# Patient Record
(demographics unavailable — no encounter records)

---

## 2024-11-22 NOTE — DISCUSSION/SUMMARY
[FreeTextEntry1] : FLU GIVEN TODAY Provided counseling on the diseases to be vaccinated against as well as the risks/benefits of providing and withholding recommended vaccines to be given today to JAYDE .All questions were answered and the parent verbalized understanding.
Detail Level: Detailed
Add 33507 Cpt? (Important Note: In 2017 The Use Of 39682 Is Being Tracked By Cms To Determine Future Global Period Reimbursement For Global Periods): yes

## 2025-03-13 NOTE — DISCUSSION/SUMMARY
[FreeTextEntry1] : LOM Complete antibiotic course. Potential side effect of antibiotics includes but not limited to diarrhea. Provide ibuprofen as needed for pain or fever. If no improvement within 48 hours return for re-evaluation. Follow up in 2-3 wks for tympanometry.  Viral syndrome most likely. Physiologic nature of fever explained. Reviewed proper doses of acetaminophen and ibuprofen. Provided with guidance as to when to call or return to office.  RTO PRN advised on signs and symptoms requiring re evaluation.

## 2025-03-13 NOTE — PHYSICAL EXAM
[Clear] : right tympanic membrane clear [Erythema] : erythema [Retracted] : retracted [NL] : warm, clear [FreeTextEntry4] : congestion [de-identified] : lace rash on arms and abdomen, cheeks bright pink

## 2025-03-13 NOTE — HISTORY OF PRESENT ILLNESS
[de-identified] : fever [FreeTextEntry6] : fever Tmax 102.6 cold sx, cough and congestion sick x 2-3 days

## 2025-03-26 NOTE — DISCUSSION/SUMMARY
[FreeTextEntry1] : resolved OM residual congestion part of URI based will resolved w time saline f/u prn

## 2025-04-17 NOTE — DISCUSSION/SUMMARY
[FreeTextEntry1] : Complete antibiotic course. Potential side effect of antibiotics includes but not limited to diarrhea. Provide ibuprofen as needed for pain or fever. If no improvement within 48 hours return for re-evaluation. Follow up in 10 days

## 2025-04-17 NOTE — REVIEW OF SYSTEMS
[Fever] : fever [Ear Pain] : ear pain [Nasal Congestion] : nasal congestion [Negative] : Genitourinary [Chills] : no chills [Headache] : no headache [Eye Discharge] : no eye discharge [Nasal Discharge] : no nasal discharge [Sore Throat] : no sore throat [Cough] : no cough [Vomiting] : no vomiting

## 2025-04-17 NOTE — HISTORY OF PRESENT ILLNESS
[de-identified] : fever ear ache congestion [FreeTextEntry6] : 102 last night with congestion right ear pain

## 2025-04-17 NOTE — PHYSICAL EXAM
[Erythema] : erythema [Purulent Effusion] : purulent effusion [Clear to Auscultation Bilaterally] : clear to auscultation bilaterally [NL] : warm, clear [Acute Distress] : no acute distress [Conjuctival Injection] : no conjunctival injection [Discharge] : no discharge [Clear] : right tympanic membrane not clear

## 2025-04-22 NOTE — HISTORY OF PRESENT ILLNESS
[de-identified] : BLOOD IN UNDERWEAR [FreeTextEntry6] : noticed dry blood in underwear last night some discharge dry on underwear today no fever , no trauma no N or V no dysuria acting normal

## 2025-04-22 NOTE — DISCUSSION/SUMMARY
[FreeTextEntry1] : Advised Vaseline to introitus reassured for now if recurrence - to return  UA nl doubt UTI

## 2025-04-25 NOTE — PHYSICAL EXAM
[NL] : warm, clear [Clear Effusion] : clear effusion [Purulent Effusion] : purulent effusion [Mucoid Discharge] : mucoid discharge [FreeTextEntry3] : BSOM

## 2025-04-25 NOTE — DISCUSSION/SUMMARY
[FreeTextEntry1] : BSOM TYMP FLAT BL HEARING NL CHRONIC RHINITIS = CK ADENOIDS  REFER TO ENT F/U IN 1 MONTH CK FLUID